# Patient Record
Sex: MALE | Race: WHITE | NOT HISPANIC OR LATINO
[De-identification: names, ages, dates, MRNs, and addresses within clinical notes are randomized per-mention and may not be internally consistent; named-entity substitution may affect disease eponyms.]

---

## 2022-03-08 ENCOUNTER — APPOINTMENT (OUTPATIENT)
Dept: PULMONOLOGY | Facility: CLINIC | Age: 57
End: 2022-03-08
Payer: COMMERCIAL

## 2022-03-08 VITALS — BODY MASS INDEX: 36.37 KG/M2 | WEIGHT: 240 LBS | HEIGHT: 68 IN

## 2022-03-08 DIAGNOSIS — Z72.89 OTHER PROBLEMS RELATED TO LIFESTYLE: ICD-10-CM

## 2022-03-08 DIAGNOSIS — G47.33 OBSTRUCTIVE SLEEP APNEA (ADULT) (PEDIATRIC): ICD-10-CM

## 2022-03-08 DIAGNOSIS — Z78.9 OTHER SPECIFIED HEALTH STATUS: ICD-10-CM

## 2022-03-08 DIAGNOSIS — I10 ESSENTIAL (PRIMARY) HYPERTENSION: ICD-10-CM

## 2022-03-08 PROBLEM — Z00.00 ENCOUNTER FOR PREVENTIVE HEALTH EXAMINATION: Status: ACTIVE | Noted: 2022-03-08

## 2022-03-08 PROCEDURE — 99214 OFFICE O/P EST MOD 30 MIN: CPT

## 2022-03-08 NOTE — PHYSICAL EXAM
[General Appearance - Well Developed] : well developed [General Appearance - Well Nourished] : well nourished [IV] : IV [Heart Sounds] : normal S1 and S2 [Murmurs] : no murmurs [] : no respiratory distress [Auscultation Breath Sounds / Voice Sounds] : lungs were clear to auscultation bilaterally [FreeTextEntry1] : no edema [No Focal Deficits] : no focal deficits [Oriented To Time, Place, And Person] : oriented to person, place, and time [Memory Recent] : recent memory was not impaired

## 2022-03-08 NOTE — ASSESSMENT
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  He is not a candidate for inspire at this time given his elevated BMI.  If he loses weight and is less than to 20 pounds then we can do a sleep study to assess the degree of sleep apnea at that time and consider inspire therapy.  Based on his current anatomy inspire is not going to work by itself.\par \par We also talked about somnera system, he will look into it as well.

## 2022-03-08 NOTE — HISTORY OF PRESENT ILLNESS
[FreeTextEntry1] : 56  -year-old male with history of hypertension, has severe\par obstructive sleep apnea . Patient has\par significant desaturations as well. He tried cpap for a while and is not able to use it, he stopped using his cpap machine. \par He is still sleepy during the day, he has loud snoring.\par \par He also had UPPP surgery done with no improvement in the\par symptoms. Patient underwent deviated nasal septal surgery as well.\par Patient tried oral appliance which gave him tmj problems.\par

## 2022-08-02 ENCOUNTER — TRANSCRIPTION ENCOUNTER (OUTPATIENT)
Age: 57
End: 2022-08-02

## 2022-08-18 ENCOUNTER — APPOINTMENT (OUTPATIENT)
Dept: SURGERY | Facility: CLINIC | Age: 57
End: 2022-08-18

## 2022-08-18 ENCOUNTER — NON-APPOINTMENT (OUTPATIENT)
Age: 57
End: 2022-08-18

## 2022-08-18 DIAGNOSIS — K57.32 DIVERTICULITIS OF LARGE INTESTINE W/OUT PERFORATION OR ABSCESS W/OUT BLEEDING: ICD-10-CM

## 2022-08-18 PROCEDURE — 99214 OFFICE O/P EST MOD 30 MIN: CPT

## 2022-08-18 RX ORDER — ALBUTEROL SULFATE 2.5 MG/3ML
(2.5 MG/3ML) VIAL, NEBULIZER (ML) INHALATION
Refills: 0 | Status: ACTIVE | COMMUNITY

## 2022-08-18 RX ORDER — ROSUVASTATIN CALCIUM 5 MG/1
TABLET, FILM COATED ORAL
Refills: 0 | Status: ACTIVE | COMMUNITY

## 2022-08-18 RX ORDER — AMLODIPINE BESYLATE 5 MG/1
TABLET ORAL
Refills: 0 | Status: ACTIVE | COMMUNITY

## 2022-08-18 RX ORDER — LOSARTAN POTASSIUM 100 MG/1
TABLET, FILM COATED ORAL
Refills: 0 | Status: ACTIVE | COMMUNITY

## 2022-08-18 NOTE — PHYSICAL EXAM
[Normal Breath Sounds] : Normal breath sounds [Normal Heart Sounds] : normal heart sounds [No Rash or Lesion] : No rash or lesion [Oriented to Person] : oriented to person [Oriented to Place] : oriented to place [Oriented to Time] : oriented to time [Calm] : calm [de-identified] : NAD [de-identified] : soft, minimal LLQ tenderness to deep palpation, no rebound,  no masses.

## 2022-08-18 NOTE — PLAN
[FreeTextEntry1] : He may  schedule the surgery and undergo PST/Covid testing prior depending how he feels over the next few weeks. He knows to call if LLQ pain returns to go back on abx. Reperforation or new perforation a possibility. . He is to have a repeat Ct scan prior to surgery and is to undergo a C scope. prior\par

## 2022-08-18 NOTE — ASSESSMENT
[FreeTextEntry1] : chronic diverticulitis, options discussed, recommended an elective robotic assisted laparoscopic sigmoid colectomy, The risks benefits and alternatives were discussed and the patient agrees to the described plan.However, he would like to wait to see how he does with diet and stress changes. He is amenable to surgery and knows recurrence is a possibility but would like to wait for now. \par

## 2022-08-18 NOTE — HISTORY OF PRESENT ILLNESS
[de-identified] : The patient is here for follow up s/p recent admission for diverticulitis with localized perforation without need for drainage. Treated with abx and released on po abx. Feeling better. No longer with the severe left lower quadrant abd pain as before. Bowel movements soft but foamed. Lilian po. No pains. Off po abx now for 10 days.

## 2022-09-04 ENCOUNTER — TRANSCRIPTION ENCOUNTER (OUTPATIENT)
Age: 57
End: 2022-09-04